# Patient Record
Sex: MALE | Race: WHITE | Employment: FULL TIME | URBAN - NONMETROPOLITAN AREA
[De-identification: names, ages, dates, MRNs, and addresses within clinical notes are randomized per-mention and may not be internally consistent; named-entity substitution may affect disease eponyms.]

---

## 2024-07-30 ENCOUNTER — OFFICE VISIT (OUTPATIENT)
Dept: PULMONOLOGY | Age: 29
End: 2024-07-30
Payer: COMMERCIAL

## 2024-07-30 VITALS
SYSTOLIC BLOOD PRESSURE: 136 MMHG | TEMPERATURE: 98 F | BODY MASS INDEX: 37.33 KG/M2 | OXYGEN SATURATION: 96 % | HEIGHT: 69 IN | WEIGHT: 252 LBS | DIASTOLIC BLOOD PRESSURE: 88 MMHG | HEART RATE: 88 BPM

## 2024-07-30 DIAGNOSIS — R06.81 WITNESSED EPISODE OF APNEA: ICD-10-CM

## 2024-07-30 DIAGNOSIS — G47.34 NOCTURNAL OXYGEN DESATURATION: ICD-10-CM

## 2024-07-30 DIAGNOSIS — E66.9 OBESITY (BMI 30-39.9): ICD-10-CM

## 2024-07-30 DIAGNOSIS — G47.19 EXCESSIVE DAYTIME SLEEPINESS: Primary | ICD-10-CM

## 2024-07-30 PROCEDURE — 99204 OFFICE O/P NEW MOD 45 MIN: CPT | Performed by: INTERNAL MEDICINE

## 2024-07-30 RX ORDER — FEXOFENADINE HCL AND PSEUDOEPHEDRINE HCI 60; 120 MG/1; MG/1
1 TABLET, EXTENDED RELEASE ORAL 2 TIMES DAILY
COMMUNITY
Start: 2019-08-31

## 2024-07-30 NOTE — PROGRESS NOTES
New Sleep Patient H/P    Presentation:  Zander is referred by Dr Terrance Craft for  sleep eval      Patient is a 28-year-old gentleman who comes with his wife referred by Dr. Terrance Craft due to complaints of nonrestorative sleep, loud disruptive snoring, witnessed episodes of apnea and gasping for air during his sleep according to his wife, weight gain, excessive daytime sleepiness, taking naps, falling asleep watching TV.    Other complaints include difficulty falling asleep as well as difficulty waking up in the morning, daytime fatigue and tiredness, sleep talking.        Time in Bed:   Bedtime: 9 p.m.   Awakens  6:30 a.m.   Different on weekends? Yes  How?sleeps until 8 am     Zander falls asleep in 25  minutes.  Any awakenings? Yes  Difficulty Falling back to sleep? No  Symptoms began:  several years ago.    Symptoms include: snoring, choking, gasping, periods of not breathing, excessive daytime sleepiness, disrupted sleep, naps    Previous evaluation and treatment? No        He denies any history of sleep walking or sleep talking. No history of seizures activity. No history suggestive of restless legs syndrome. No history of bruxism. No history of head injury.    Naps:  Any naps? Yes and are they helpful Yes    Snoring and Apneas:  Do you snore or been told you a snore? Yes  How long have known about your snoring? years  Any witnessed apneas? Yes  Any awakenings with choking or gasping? Yes    Dreams:  Any recurring dreams? No  Hallucinations? No  Sleep Paralysis? No  Symptoms of Cataplexy? No    Driving History:  Do you have a CDL or drive long distances for work? No  Any driving accidents in the past year? No  Any sleepiness while driving? No    Weight:  Any change in weight over the past year? Yes   How about past 5 years? Yes  How much? 15      No past medical history on file.    No past surgical history on file.    Social History     Tobacco Use    Smoking status:

## 2024-09-05 ENCOUNTER — HOSPITAL ENCOUNTER (OUTPATIENT)
Dept: SLEEP CENTER | Age: 29
Discharge: HOME | End: 2024-09-05
Payer: COMMERCIAL

## 2024-09-05 DIAGNOSIS — G47.19 EXCESSIVE DAYTIME SLEEPINESS: ICD-10-CM

## 2024-09-05 DIAGNOSIS — R06.81 WITNESSED EPISODE OF APNEA: ICD-10-CM

## 2024-09-05 DIAGNOSIS — G47.34 NOCTURNAL OXYGEN DESATURATION: ICD-10-CM

## 2024-09-05 DIAGNOSIS — E66.9 OBESITY (BMI 30-39.9): ICD-10-CM

## 2024-09-05 PROCEDURE — 95806 SLEEP STUDY UNATT&RESP EFFT: CPT

## 2024-09-05 NOTE — PROGRESS NOTES
Zander presents today for a HST instruction and demonstration on unit # 315. Questions were asked and answers given.  He was able to return demonstration and verbalized understanding.  The sleep center control room phone number was provided in case questions arise during the study. Informed patient to call 911 in case of an emergency.   He states he will return the unit tomorrow before 1000.                       Title:  Home Sleep Apnea Testing (HSAT)     Approved by:  Awilda Bhagat MD        Approval Date:   March, 2024 Next Review:   March, 2026       Responsible Party:  Nida Tipton  Institution/Entities Applies to:    [facility]    Policy Number:  None      Document Type:  Such as Guideline, Policy,   Policy & Procedure, or Procedure, Instructions   Manual:  Policy and Procedures     Section: IV  Policy Start Date: June, 2011       HOME SLEEP APNEA TESTING (HSAT)      PURPOSE: To ensure home sleep apnea testing (HSAT) conducted by the sleep facility adheres to the current AASM practice parameters, clinical practice guidelines, best practice and clinical guidelines in regard to the diagnosis of YISEL in adults.       POLICY:      HSAT is a method of recording certain parameters which will target and measure, minimally, heart rate, oxygen saturation, respiratory airflow, respiratory effort and snoring for the purpose of evaluating a patient for YISEL.       HSAT will be performed in conjunction with a comprehensive sleep evaluation by an appropriately licensed sleep facility medical staff member. All portable monitoring equipment will be FDA-approved and appropriately maintained to ensure patient safety and efficiency of the test.       PROCEDURE:      An order from a licensed sleep physician along with appropriate medical history documenting the indication for HSAT that complies with the AASM practice parameters.    All tests will be performed, and records will be

## 2024-10-21 NOTE — PROGRESS NOTES
Sleep Medicine    Zander Wilkins, 29 y.o.  [id number]    Nurses Notes   HST follow up   Study Results    Initial Study Date -  9.5.24  AHI -  27.5    Total Events - 271  (Apneas  51  Hypopneas 220  Central  8)  (Total Sleep Time - 591 min)  Time with Sats below 88% - 31.1 min  Oxygen level - Room Air  93.3    NECK: 17.5  MP: 2  ESS:3  SAQLI: 75            INTERVAL HISTORY         Zander Wilkins is a 29 y.o. old male who comes in to review the results of his recent sleep study, to answer questions and to explore options for treatment.    PMH  History reviewed. No pertinent past medical history.  No past surgical history on file.  Social History     Tobacco Use    Smoking status: Never   Substance Use Topics    Alcohol use: No    Drug use: No     No family history on file.    ALLERGIES  No Known Allergies    MEDS  Current Outpatient Medications   Medication Sig Dispense Refill    fexofenadine-pseudoephedrine (ALLEGRA-D 12HR)  MG per extended release tablet Take 1 tablet by mouth 2 times daily (Patient not taking: Reported on 7/30/2024)       No current facility-administered medications for this visit.       EXAM  Vitals -  /84 (Site: Left Upper Arm, Position: Sitting, Cuff Size: Large Adult)   Pulse 82   Temp 97.9 °F (36.6 °C)   Ht 1.727 m (5' 8\")   Wt 114.9 kg (253 lb 3.2 oz)   SpO2 96% Comment: ra  BMI 38.50 kg/m²    Body mass index is 38.5 kg/m².    Dentition - good  Constitutional - No distress. Patient is oriented to person, place, and time.  Mouth/Throat - Oropharynx is clear and moist.   Neck - Neck supple. No JVD present. No tracheal deviation present.   Psychiatric - Patient  has a normal mood and affect.    ASSESSMENT/  RECOMMENDATIONS       Diagnosis Orders   1. YISEL (obstructive sleep apnea)        2. Obesity (BMI 30-39.9)        3. Excessive daytime sleepiness            I reviewed the sleep study results in detail with patient.   We discussed various treatment options including positional

## 2024-10-22 ENCOUNTER — OFFICE VISIT (OUTPATIENT)
Dept: PULMONOLOGY | Age: 29
End: 2024-10-22
Payer: COMMERCIAL

## 2024-10-22 VITALS
BODY MASS INDEX: 38.37 KG/M2 | HEART RATE: 82 BPM | SYSTOLIC BLOOD PRESSURE: 122 MMHG | WEIGHT: 253.2 LBS | OXYGEN SATURATION: 96 % | HEIGHT: 68 IN | TEMPERATURE: 97.9 F | DIASTOLIC BLOOD PRESSURE: 84 MMHG

## 2024-10-22 DIAGNOSIS — G47.19 EXCESSIVE DAYTIME SLEEPINESS: ICD-10-CM

## 2024-10-22 DIAGNOSIS — G47.33 OSA (OBSTRUCTIVE SLEEP APNEA): Primary | ICD-10-CM

## 2024-10-22 DIAGNOSIS — E66.9 OBESITY (BMI 30-39.9): ICD-10-CM

## 2024-10-22 PROCEDURE — 99204 OFFICE O/P NEW MOD 45 MIN: CPT | Performed by: INTERNAL MEDICINE

## 2025-01-23 NOTE — PROGRESS NOTES
Sleep Medicine Follow Up  MD Zander Meeks, 29 y.o.  [id number]     Nurses Notes   YISEL follow up after pap set up     Date of Last Visit  10/22/2024      Scan of Download                    Summary of PAP Download     Dates  2024  -   2025  Four Hour Compliance - 93 %. Total Hours -  565  Average Hours/Day -  7 hours 27 minutes        AHI -  1.0    Original AHI -  27.5 Initial Study Date -  2024  PAP Type -  Auto       Machine Type - Resmed    Pressure Settings - 5/20 cmH2O   Interface -  FFM    Provider  [x][facility]  [][employer]  [][facility]  [][facility]         [][facility] []Other:     ESS: 0      Neck Circumference: 17.5  Mallimpati: 2    2025    TELEHEALTH EVALUATION -- Audio/Visual    HPI:    Zander Wilkins (:  1995) has requested an audio/video evaluation for the following concern(s):        Review of Systems    Prior to Visit Medications    Medication Sig Taking? Authorizing Provider   fexofenadine-pseudoephedrine (ALLEGRA-D 12HR)  MG per extended release tablet Take 1 tablet by mouth 2 times daily  Patient not taking: Reported on 2024  ProviderOliver MD       Social History     Tobacco Use    Smoking status: Never   Substance Use Topics    Alcohol use: No    Drug use: No            PHYSICAL EXAMINATION:  [ INSTRUCTIONS:  \"[x]\" Indicates a positive item  \"[]\" Indicates a negative item  -- DELETE ALL ITEMS NOT EXAMINED]  Vital Signs: (As obtained by patient/caregiver or practitioner observation)        Constitutional: [x] Appears well-developed and well-nourished [] No apparent distress      [] Abnormal-   Mental status  [x] Alert and awake  [] Oriented to person/place/time []Able to follow commands      Eyes:  EOM    [x]  Normal  [] Abnormal-  Sclera  [x]  Normal  [] Abnormal -         Discharge [x]  None visible  [] Abnormal -    HENT:   [x] Normocephalic, atraumatic.  [] Abnormal   [x] Mouth/Throat: Mucous membranes are moist.     External Ears [x] Normal

## 2025-01-24 ENCOUNTER — TELEMEDICINE (OUTPATIENT)
Dept: PULMONOLOGY | Age: 30
End: 2025-01-24
Payer: COMMERCIAL

## 2025-01-24 DIAGNOSIS — G47.33 OSA ON CPAP: Primary | ICD-10-CM

## 2025-01-24 DIAGNOSIS — E66.9 OBESITY (BMI 30-39.9): ICD-10-CM

## 2025-01-24 PROCEDURE — 99213 OFFICE O/P EST LOW 20 MIN: CPT | Performed by: INTERNAL MEDICINE
